# Patient Record
Sex: FEMALE | Race: WHITE | Employment: FULL TIME | URBAN - METROPOLITAN AREA
[De-identification: names, ages, dates, MRNs, and addresses within clinical notes are randomized per-mention and may not be internally consistent; named-entity substitution may affect disease eponyms.]

---

## 2017-08-11 ENCOUNTER — HOSPITAL ENCOUNTER (OUTPATIENT)
Dept: MAMMOGRAPHY | Age: 52
Discharge: HOME | End: 2017-08-11
Attending: OBSTETRICS & GYNECOLOGY
Payer: COMMERCIAL

## 2017-08-11 DIAGNOSIS — Z12.31 VISIT FOR SCREENING MAMMOGRAM: ICD-10-CM

## 2017-08-11 PROCEDURE — 77067 SCR MAMMO BI INCL CAD: CPT

## 2018-08-22 ENCOUNTER — HOSPITAL ENCOUNTER (OUTPATIENT)
Dept: MAMMOGRAPHY | Age: 53
Discharge: HOME | End: 2018-08-22
Attending: OBSTETRICS & GYNECOLOGY
Payer: COMMERCIAL

## 2018-08-22 DIAGNOSIS — Z12.39 BREAST SCREENING: ICD-10-CM

## 2018-08-22 PROCEDURE — 77067 SCR MAMMO BI INCL CAD: CPT

## 2019-06-27 ENCOUNTER — OFFICE VISIT (OUTPATIENT)
Dept: PRIMARY CARE CLINIC | Age: 54
End: 2019-06-27

## 2019-06-27 VITALS
WEIGHT: 138.6 LBS | SYSTOLIC BLOOD PRESSURE: 113 MMHG | BODY MASS INDEX: 26.17 KG/M2 | HEART RATE: 85 BPM | TEMPERATURE: 99.5 F | DIASTOLIC BLOOD PRESSURE: 73 MMHG | OXYGEN SATURATION: 98 % | HEIGHT: 61 IN | RESPIRATION RATE: 14 BRPM

## 2019-06-27 DIAGNOSIS — M70.22 OLECRANON BURSITIS OF LEFT ELBOW: Primary | ICD-10-CM

## 2019-06-27 RX ORDER — FEXOFENADINE HCL 60 MG
TABLET ORAL DAILY
COMMUNITY
Start: 2007-07-10

## 2019-06-27 RX ORDER — LANOLIN ALCOHOL/MO/W.PET/CERES
CREAM (GRAM) TOPICAL AS NEEDED
COMMUNITY
Start: 2012-08-07

## 2019-06-27 RX ORDER — CLINDAMYCIN HYDROCHLORIDE 300 MG/1
300 CAPSULE ORAL 3 TIMES DAILY
Qty: 42 CAP | Refills: 0 | Status: SHIPPED | OUTPATIENT
Start: 2019-06-27 | End: 2019-07-11

## 2019-06-27 RX ORDER — MOMETASONE FUROATE 1 MG/G
CREAM TOPICAL AS NEEDED
COMMUNITY
Start: 2015-10-27

## 2019-06-27 RX ORDER — CLOBETASOL PROPIONATE 0.5 MG/ML
LOTION TOPICAL
COMMUNITY

## 2019-06-27 RX ORDER — FLUTICASONE PROPIONATE AND SALMETEROL 100; 50 UG/1; UG/1
1 POWDER RESPIRATORY (INHALATION) 2 TIMES DAILY
COMMUNITY
Start: 2007-07-10

## 2019-06-27 RX ORDER — PSEUDOEPHEDRINE HCL 30 MG
TABLET ORAL AS NEEDED
COMMUNITY
Start: 2008-07-18

## 2019-06-27 RX ORDER — HYDROCORTISONE 25 MG/G
CREAM TOPICAL AS NEEDED
COMMUNITY
Start: 2019-04-24

## 2019-06-27 RX ORDER — FLUTICASONE PROPIONATE 50 MCG
SPRAY, SUSPENSION (ML) NASAL DAILY
COMMUNITY
Start: 2007-07-10

## 2019-06-27 RX ORDER — PIMECROLIMUS 10 MG/G
CREAM TOPICAL AS NEEDED
COMMUNITY

## 2019-06-27 NOTE — PROGRESS NOTES
Sachin Joe is a 47 y.o. female    Chief Complaint   Patient presents with    Elbow Swelling     Left Elbow Swelling     1. Have you been to the ER, urgent care clinic since your last visit? Hospitalized since your last visit? No    2. Have you seen or consulted any other health care providers outside of the [address] [place] [address] since your last visit? Include any pap smears or colon screening.  No

## 2019-06-29 NOTE — PATIENT INSTRUCTIONS
Bursitis of the Elbow: Care Instructions  Your Care Instructions  Bursitis is pain and swelling of the bursae. These are sacs of fluid that help your joints move smoothly. Olecranon bursitis is a type of bursitis that affects the back of the elbow. This is sometimes called Eliud elbow because the bump that develops looks like the cartoon character Eliud's elbow. Injury, overuse, or prolonged pressure on your elbow can cause this form of bursitis. Sometimes it happens when people have arthritis. It also can occur for unknown reasons. Treatment may include draining fluid from the bursa with a needle. If your doctor thought there was infection, he or she may have prescribed antibiotics. You also may get shots of medicine into the bursa to help the swelling go down. Your elbow should get better in a few days or weeks. Follow-up care is a key part of your treatment and safety. Be sure to make and go to all appointments, and call your doctor if you are having problems. It's also a good idea to know your test results and keep a list of the medicines you take. How can you care for yourself at home? · Take pain medicines exactly as directed. ? If the doctor gave you a prescription medicine for pain, take it as prescribed. ? If you are not taking a prescription pain medicine, ask your doctor if you can take an over-the-counter medicine. ? Do not take two or more pain medicines at the same time unless the doctor told you to. Many pain medicines have acetaminophen, which is Tylenol. Too much acetaminophen (Tylenol) can be harmful. · If your doctor prescribed antibiotics, take them as directed. Do not stop taking them just because you feel better. You need to take the full course of antibiotics. · If your doctor gave you a sling, an elastic bandage, or a compression sleeve, wear it exactly as instructed. · Put ice or a cold pack on your elbow for 10 to 20 minutes at a time.  Try to do this every 1 to 2 hours for the next 3 days (when you are awake) or until the swelling goes down. Put a thin cloth between the ice and your skin. · After 3 days, you can try heat, or alternate heat and ice. · Rest your elbow. Try to stop or reduce any activity that causes pain. · Wear elbow pads during physical activity to prevent injury. · Do not lean your elbows on tables or armrests. When should you call for help? Call your doctor now or seek immediate medical care if:    · You have new or worse symptoms of infection, such as:  ? Increased pain, swelling, warmth, or redness. ? Red streaks leading from the area. ? Pus draining from the area. ? A fever.    Watch closely for changes in your health, and be sure to contact your doctor if:    · You do not get better as expected. Where can you learn more? Go to http://vito-jess.info/. Enter  in the search box to learn more about \"Bursitis of the Elbow: Care Instructions. \"  Current as of: September 20, 2018  Content Version: 11.9  © 9274-0630 [employer]. Care instructions adapted under license by [employer] (which disclaims liability or warranty for this information). If you have questions about a medical condition or this instruction, always ask your healthcare professional. [address] any warranty or liability for your use of this information.

## 2019-06-29 NOTE — PROGRESS NOTES
Subjective: Atif Blood is a 47 y.o. female seen for evaluation and treatment of a left elbow injury. This is evaluated as a personal injury. The injury was sustained 10 days ago, and occurred while she was working in the house, she bumped her left elbow on something and now it is swollen. She did not hear or sense a pop or snap at the time of the injury. The patient notes pain and moderate swelling since the injury. She has not injured this site in the past.    There is no problem list on file for this patient. There are no active problems to display for this patient. Current Outpatient Medications   Medication Sig Dispense Refill    fexofenadine (ALLEGRA ALLERGY) 60 mg tablet Allegra 60 mg tablet      fluticasone propionate (FLONASE) 50 mcg/actuation nasal spray Flonase 50 mcg/actuation nasal spray,suspension      fluticasone propion-salmeterol (ADVAIR DISKUS) 100-50 mcg/dose diskus inhaler Advair Diskus 100 mcg-50 mcg/dose powder for inhalation      pseudoephedrine (SUDAFED) 30 mg tablet as needed.  melatonin 3 mg tablet as needed.  hydrocortisone (HYTONE) 2.5 % topical cream       pimecrolimus (ELIDEL) 1 % topical cream Elidel 1 % topical cream   APPLY A THIN LAYER TO THE AFFECTED AREA(S) BY TOPICAL ROUTE 2 TIMES PER DAY ; RUB IN GENTLY AND COMPLETELY      mometasone (ELOCON) 0.1 % topical cream mometasone 0.1 % topical cream      clobetasol (CLOBEX) 0.05 % lotion clobetasol 0.05 % lotion   APPLY A THIN LAYER TO THE AFFECTED AREA(S) BY TOPICAL ROUTE 2 TIMES PER DAY      clindamycin (CLEOCIN) 300 mg capsule Take 1 Cap by mouth three (3) times daily for 14 days. 42 Cap 0     Allergies   Allergen Reactions    Sulfa (Sulfonamide Antibiotics) Rash     History reviewed. No pertinent past medical history. History reviewed. No pertinent surgical history.   Family History   Problem Relation Age of Onset    Breast Cancer Paternal Aunt      Social History     Tobacco Use    Smoking status: Never Smoker    Smokeless tobacco: Never Used   Substance Use Topics    Alcohol use: Yes     Comment: Rarely        Objective:     Visit Vitals  /73 (BP 1 Location: Right arm, BP Patient Position: Sitting)   Pulse 85   Temp 99.5 °F (37.5 °C) (Oral)   Resp 14   Ht 5' 1\" (1.549 m)   Wt 138 lb 9.6 oz (62.9 kg)   SpO2 98%   BMI 26.19 kg/m²      Appearance: alert, well appearing, and in no distress, oriented to person, place, and time, normal appearing weight, acyanotic, in no respiratory distress and well hydrated. Musculoskeletal exam: no joint tenderness, deformity or swelling. Imaging  is not indicated    Assessment/Plan:       ICD-10-CM ICD-9-CM    1. Olecranon bursitis of left elbow M70.22 726.33 clindamycin (CLEOCIN) 300 mg capsule      REFERRAL TO ORTHOPEDIC SURGERY     reviewed medications and side effects in detail  The patient is advised to rest.    Visit Vitals  /73 (BP 1 Location: Right arm, BP Patient Position: Sitting)   Pulse 85   Temp 99.5 °F (37.5 °C) (Oral)   Resp 14   Ht 5' 1\" (1.549 m)   Wt 138 lb 9.6 oz (62.9 kg)   SpO2 98%   BMI 26.19 kg/m²     Spoke with the patient regarding their blood pressure (BP) reading at today's visit. The patient verbalized understanding of need to maintain BP lower than 140/90. The patient will follow up with their primary care physician regarding management and/or medications that may be needed. Drepssion Screening has been completed. The patient isdenies having a history of depression. The patient is nottaking medication and is being followed by their PCP at this time. This patient does  have a primary care physician. Referral was not given a referral at todays visit. Immunizations: The patient is current on their influenza immunization at this time. The patient does not   want to receive the influenza immunization today. Follow up instructions given at today's visit were verbalized by the patient/parent.   The signs of infection are fever > 100.4, increase fatigue, change in mental status, or decrease in urinary output. The patient/parent verbalized understanding of taking medications prescribed during this visit as prescribed.

## 2019-08-23 ENCOUNTER — HOSPITAL ENCOUNTER (OUTPATIENT)
Dept: MAMMOGRAPHY | Age: 54
Discharge: HOME | End: 2019-08-23
Attending: OBSTETRICS & GYNECOLOGY
Payer: COMMERCIAL

## 2019-08-23 DIAGNOSIS — Z12.39 BREAST SCREENING, UNSPECIFIED: ICD-10-CM

## 2019-08-23 PROCEDURE — 77063 BREAST TOMOSYNTHESIS BI: CPT

## 2019-10-09 ENCOUNTER — CLINICAL SUPPORT (OUTPATIENT)
Dept: PRIMARY CARE CLINIC | Age: 54
End: 2019-10-09

## 2019-10-09 DIAGNOSIS — Z23 ENCOUNTER FOR IMMUNIZATION: Primary | ICD-10-CM

## 2019-10-10 NOTE — PROGRESS NOTES
Chief Complaint   Patient presents with    Immunization/Injection     Placement of Influenza vaccine     Immunization/s administered 10/10/2019 by David Proctor LPN with guardian's consent. Patient tolerated procedure well. No reactions noted.

## 2019-10-10 NOTE — PATIENT INSTRUCTIONS
Vaccine Information Statement    Influenza (Flu) Vaccine (Inactivated or Recombinant): What You Need to Know    Many Vaccine Information Statements are available in Bulgarian and other languages. See www.immunize.org/vis  Hojas de información sobre vacunas están disponibles en español y en muchos otros idiomas. Visite www.immunize.org/vis    1. Why get vaccinated? Influenza vaccine can prevent influenza (flu). Flu is a contagious disease that spreads around the [facility] every year, usually between October and May. Anyone can get the flu, but it is more dangerous for some people. Infants and young children, people 72years of age and older, pregnant women, and people with certain health conditions or a weakened immune system are at greatest risk of flu complications. Pneumonia, bronchitis, sinus infections and ear infections are examples of flu-related complications. If you have a medical condition, such as heart disease, cancer or diabetes, flu can make it worse. Flu can cause fever and chills, sore throat, muscle aches, fatigue, cough, headache, and runny or stuffy nose. Some people may have vomiting and diarrhea, though this is more common in children than adults. Each year thousands of people in the [facility] die from flu, and many more are hospitalized. Flu vaccine prevents millions of illnesses and flu-related visits to the doctor each year. 2. Influenza vaccines     CDC recommends everyone 10months of age and older get vaccinated every flu season. Children 6 months through 6years of age may need 2 doses during a single flu season. Everyone else needs only 1 dose each flu season. It takes about 2 weeks for protection to develop after vaccination. There are many flu viruses, and they are always changing. Each year a new flu vaccine is made to protect against three or four viruses that are likely to cause disease in the upcoming flu season.  Even when the vaccine doesnt exactly match these viruses, it may still provide some protection. Influenza vaccine does not cause flu. Influenza vaccine may be given at the same time as other vaccines. 3. Talk with your health care provider    Tell your vaccine provider if the person getting the vaccine:   Has had an allergic reaction after a previous dose of influenza vaccine, or has any severe, life-threatening allergies.  Has ever had Guillain-Barré Syndrome (also called GBS). In some cases, your health care provider may decide to postpone influenza vaccination to a future visit. People with minor illnesses, such as a cold, may be vaccinated. People who are moderately or severely ill should usually wait until they recover before getting influenza vaccine. Your health care provider can give you more information. 4. Risks of a reaction     Soreness, redness, and swelling where shot is given, fever, muscle aches, and headache can happen after influenza vaccine.  There may be a very small increased risk of Guillain-Barré Syndrome (GBS) after inactivated influenza vaccine (the flu shot). Niecy Dumont children who get the flu shot along with pneumococcal vaccine (PCV13), and/or DTaP vaccine at the same time might be slightly more likely to have a seizure caused by fever. Tell your health care provider if a child who is getting flu vaccine has ever had a seizure. People sometimes faint after medical procedures, including vaccination. Tell your provider if you feel dizzy or have vision changes or ringing in the ears. As with any medicine, there is a very remote chance of a vaccine causing a severe allergic reaction, other serious injury, or death. 5. What if there is a serious problem? An allergic reaction could occur after the vaccinated person leaves the clinic.  If you see signs of a severe allergic reaction (hives, swelling of the face and throat, difficulty breathing, a fast heartbeat, dizziness, or weakness), call 9-1-1 and get the person to the nearest hospital.    For other signs that concern you, call your health care provider. Adverse reactions should be reported to the Vaccine Adverse Event Reporting System (VAERS). Your health care provider will usually file this report, or you can do it yourself. Visit the VAERS website at www.vaers. [facility].gov or call 2-108.518.8589. VAERS is only for reporting reactions, and VAERS staff do not give medical advice. 6. The National Vaccine Injury Compensation Program    The [facility] Vaccine Injury Compensation Program (VICP) is a federal program that was created to compensate people who may have been injured by certain vaccines. Visit the VICP website at www.[facility].gov/vaccinecompensation or call 5-114.472.9050 to learn about the program and about filing a claim. There is a time limit to file a claim for compensation. 7. How can I learn more?  Ask your health care provider.  Call your local or state health department.  Contact the Centers for Disease Control and Prevention (CDC):  - Call 0-161.638.5254 (1-800-CDC-INFO) or  - Visit CDCs influenza website at www.cdc.gov/flu    Vaccine Information Statement (Interim)  Inactivated Influenza Vaccine   8/15/2019  [address] [id number]-[id number]   Department of Health and Human Services  Centers for Disease Control and Prevention    Office Use Only

## 2019-11-18 ENCOUNTER — OFFICE VISIT (OUTPATIENT)
Dept: PRIMARY CARE CLINIC | Age: 54
End: 2019-11-18

## 2019-11-18 VITALS
HEIGHT: 61 IN | RESPIRATION RATE: 16 BRPM | HEART RATE: 96 BPM | DIASTOLIC BLOOD PRESSURE: 81 MMHG | WEIGHT: 140.6 LBS | SYSTOLIC BLOOD PRESSURE: 119 MMHG | TEMPERATURE: 98.5 F | OXYGEN SATURATION: 95 % | BODY MASS INDEX: 26.55 KG/M2

## 2019-11-18 DIAGNOSIS — T63.301A SPIDER BITE WOUND, ACCIDENTAL OR UNINTENTIONAL, INITIAL ENCOUNTER: Primary | ICD-10-CM

## 2019-11-18 RX ORDER — HYDROCORTISONE 25 MG/G
CREAM TOPICAL
COMMUNITY
Start: 2019-04-24 | End: 2019-11-18 | Stop reason: SDUPTHER

## 2019-11-18 RX ORDER — TRAZODONE HYDROCHLORIDE 50 MG/1
TABLET ORAL
COMMUNITY
Start: 2015-10-27

## 2019-11-18 RX ORDER — FLUTICASONE PROPIONATE 50 MCG
SPRAY, SUSPENSION (ML) NASAL
COMMUNITY
Start: 2007-07-10 | End: 2019-11-18 | Stop reason: SDUPTHER

## 2019-11-18 RX ORDER — PSEUDOEPHEDRINE HCL 30 MG
TABLET ORAL
COMMUNITY
Start: 2008-07-18 | End: 2019-11-18 | Stop reason: SDUPTHER

## 2019-11-18 RX ORDER — NAPROXEN 500 MG/1
500 TABLET ORAL
COMMUNITY
Start: 2019-07-05 | End: 2020-07-04

## 2019-11-18 RX ORDER — POLYMYXIN B SULFATE AND TRIMETHOPRIM 1; 10000 MG/ML; [USP'U]/ML
SOLUTION OPHTHALMIC
COMMUNITY
Start: 2019-07-26

## 2019-11-18 RX ORDER — DESONIDE 0.5 MG/G
CREAM TOPICAL AS NEEDED
COMMUNITY
Start: 2015-10-27

## 2019-11-18 RX ORDER — MOMETASONE FUROATE 1 MG/G
CREAM TOPICAL
COMMUNITY
Start: 2015-10-27 | End: 2019-11-18 | Stop reason: SDUPTHER

## 2019-11-18 RX ORDER — LANOLIN ALCOHOL/MO/W.PET/CERES
CREAM (GRAM) TOPICAL
COMMUNITY
Start: 2012-08-07 | End: 2019-11-18 | Stop reason: SDUPTHER

## 2019-11-18 RX ORDER — CLOBETASOL PROPIONATE 0.5 MG/ML
LOTION TOPICAL
COMMUNITY
End: 2019-11-18 | Stop reason: SDUPTHER

## 2019-11-18 RX ORDER — RANITIDINE 150 MG/1
TABLET, FILM COATED ORAL
COMMUNITY

## 2019-11-18 RX ORDER — FLUTICASONE PROPIONATE AND SALMETEROL 100; 50 UG/1; UG/1
POWDER RESPIRATORY (INHALATION)
COMMUNITY
Start: 2007-07-10 | End: 2019-11-18 | Stop reason: SDUPTHER

## 2019-11-18 RX ORDER — FEXOFENADINE HCL 60 MG
TABLET ORAL
COMMUNITY
Start: 2007-07-10 | End: 2019-11-18 | Stop reason: SDUPTHER

## 2019-11-18 RX ORDER — PIMECROLIMUS 10 MG/G
CREAM TOPICAL
COMMUNITY
End: 2019-11-18 | Stop reason: SDUPTHER

## 2019-11-18 RX ORDER — CLINDAMYCIN HYDROCHLORIDE 300 MG/1
CAPSULE ORAL
COMMUNITY
Start: 2019-06-27

## 2019-11-18 NOTE — PROGRESS NOTES
HISTORY OF PRESENT ILLNESS  Jayson Webb is a 47 y.o. female. HPI   This 47year old lady presents c/o -was informed by the school nurse to see a doctor. She believes she was \"bitten\" by a spider about 1 week ago. The area of the bite per pt has become dark and painful    Review of Systems   Constitutional: Negative for fever. Eyes: Negative for double vision, photophobia and pain. Gastrointestinal: Negative for nausea. Skin:        As per hpi       Physical Exam   Constitutional: She appears well-developed and well-nourished. No distress. Eyes: Pupils are equal, round, and reactive to light. Neck: Normal range of motion. Neck supple. Skin:   Right forehead-very darkish area about 0.5x0.5cm appearing to be necrotic tissue,very mild surrounding erythema       ASSESSMENT and PLAN    ICD-10-CM ICD-9-CM    1.  Spider bite wound, accidental or unintentional, initial encounter T63.301A 989.5 REFERRAL TO GENERAL SURGERY     E905.1    Referal to gen surg asap  Might need debridement  Precautions given

## 2019-11-18 NOTE — PROGRESS NOTES
Mera Samano is a 47 y.o. female    Room:4    Chief Complaint   Patient presents with   [address]     PT States \" possible spider bite on forehead nurse sent me over, has been using mupirocin and clindamycin and started taking yesterday and benedryl for swelling, last wednseday and thought it was just ezema but it didnt get any better, and eye swelling on thursday\". .         Visit Vitals  /81 (BP 1 Location: Left arm, BP Patient Position: Sitting)   Pulse 96   Temp 98.5 °F (36.9 °C) (Oral)   Resp 16   Ht 5' 1\" (1.549 m)   Wt 140 lb 9.6 oz (63.8 kg)   SpO2 95%   BMI 26.57 kg/m²       Pain Scale: 3/10    1. Have you been to the ER, urgent care clinic since your last visit? Hospitalized since your last visit? No    2. Have you seen or consulted any other health care providers outside of the [address] [place] [address] since your last visit? Include any pap smears or colon screening.  No

## 2019-11-19 ENCOUNTER — OFFICE VISIT (OUTPATIENT)
Dept: SURGERY | Age: 54
End: 2019-11-19

## 2019-11-19 VITALS
BODY MASS INDEX: 26.55 KG/M2 | HEIGHT: 61 IN | HEART RATE: 92 BPM | WEIGHT: 140.6 LBS | OXYGEN SATURATION: 96 % | SYSTOLIC BLOOD PRESSURE: 120 MMHG | TEMPERATURE: 99.6 F | DIASTOLIC BLOOD PRESSURE: 81 MMHG

## 2019-11-19 DIAGNOSIS — R23.4 SKIN ESCHAR: ICD-10-CM

## 2019-11-19 DIAGNOSIS — L30.9 ECZEMA OF FACE: Primary | ICD-10-CM

## 2019-11-19 RX ORDER — MUPIROCIN 20 MG/G
OINTMENT TOPICAL AS NEEDED
COMMUNITY
Start: 2019-10-29

## 2019-11-19 NOTE — PROGRESS NOTES
Chief Complaint   Patient presents with    Skin Problem     SEEN AT THE REQUEST OF DR. RITU KU, FOR SPIDER BITE     1. Have you been to the ER, urgent care clinic since your last visit? Hospitalized since your last visit? NO    2. Have you seen or consulted any other health care providers outside of the [address] [place] [address] since your last visit? Include any pap smears or colon screening.  NO

## 2019-11-19 NOTE — LETTER
11/19/19 Patient: Donny Echavarria YOB: 1965 Date of Visit: 11/19/2019 Scooter Vigil MD 
[address] [place] [address] VIA Facsimile: [phone] Pancho Alfred MD 
[address] [address] [address] VIA In Basket Dear MD Pancho Gordon MD, Thank you for referring Ms. Kenya Mckeon to  [facility] for evaluation. My notes for this consultation are attached. If you have questions, please do not hesitate to call me. I look forward to following your patient along with you.  
 
 
Sincerely, 
 
Juani Garcia MD

## 2019-11-27 ENCOUNTER — OFFICE VISIT (OUTPATIENT)
Dept: SURGERY | Age: 54
End: 2019-11-27

## 2019-11-27 VITALS
DIASTOLIC BLOOD PRESSURE: 71 MMHG | HEIGHT: 61 IN | WEIGHT: 136.9 LBS | OXYGEN SATURATION: 97 % | HEART RATE: 87 BPM | BODY MASS INDEX: 25.85 KG/M2 | TEMPERATURE: 97.2 F | RESPIRATION RATE: 20 BRPM | SYSTOLIC BLOOD PRESSURE: 113 MMHG

## 2019-11-27 DIAGNOSIS — R23.4 SKIN ESCHAR: Primary | ICD-10-CM

## 2019-11-27 NOTE — PROGRESS NOTES
HISTORY OF PRESENT ILLNESS  Keyla Velez is a 47 y.o. female who comes in for follow up by [facility] for a forehead wound  HPI    She noted a wound on her forehead 11/14. She has eczema and it was itching and she thought she may have scratched it. She stated it started to look better by 11/15-16 but it is still swollen and she reports some puffiness around her eyes. She denies myalgias, paresthesias, muscle twitching, fever, chills, or sweats. She saw someone who thought it may be from a spider bite, although she states that she has not been around spiders and denies seeing one on her. She has been utilizing mupirocin on it. Past Medical History:   Diagnosis Date    Arthritis      History reviewed. No pertinent surgical history. Family History   Problem Relation Age of Onset    Breast Cancer Paternal [de-identified]     Stroke Mother     Hypertension Father      Social History     Tobacco Use    Smoking status: Never Smoker    Smokeless tobacco: Never Used   Substance Use Topics    Alcohol use: Yes     Comment: Rarely    Drug use: Never     Current Outpatient Medications   Medication Sig    diphenhydramine HCl (BENADRYL PO) Take  by mouth as needed.  mupirocin (BACTROBAN) 2 % ointment as needed.  traZODone (DESYREL) 50 mg tablet trazodone 50 mg tablet    desonide (TRIDESILON) 0.05 % cream as needed.  fexofenadine (ALLEGRA ALLERGY) 60 mg tablet daily.  fluticasone propionate (FLONASE) 50 mcg/actuation nasal spray daily.  fluticasone propion-salmeterol (ADVAIR DISKUS) 100-50 mcg/dose diskus inhaler 1 Puff two (2) times a day.  pseudoephedrine (SUDAFED) 30 mg tablet as needed.  melatonin 3 mg tablet as needed.  hydrocortisone (HYTONE) 2.5 % topical cream as needed.  pimecrolimus (ELIDEL) 1 % topical cream as needed.  mometasone (ELOCON) 0.1 % topical cream as needed.     clobetasol (CLOBEX) 0.05 % lotion clobetasol 0.05 % lotion   APPLY A THIN LAYER TO THE AFFECTED AREA(S) BY TOPICAL ROUTE 2 TIMES PER DAY    raNITIdine (ZANTAC) 150 mg tablet ranitidine 150 mg tablet   Take 1 tablet twice a day by oral route.  trimethoprim-polymyxin b (POLYTRIM) ophthalmic solution     naproxen (NAPROSYN) 500 mg tablet Take 500 mg by mouth.  clindamycin (CLEOCIN) 300 mg capsule three (3) times daily as needed. No current facility-administered medications for this visit. Allergies   Allergen Reactions    Naproxen Hives    Sulfa (Sulfonamide Antibiotics) Rash       Review of Systems   Constitutional: Negative for chills, diaphoresis, fever and weight loss. HENT: Negative for sore throat. Eyes: Negative for blurred vision and discharge. Respiratory: Positive for wheezing. Negative for cough and shortness of breath. Cardiovascular: Negative for chest pain, palpitations, orthopnea, claudication and leg swelling. Gastrointestinal: Negative for abdominal pain, constipation, diarrhea, heartburn, melena, nausea and vomiting. Genitourinary: Negative for dysuria, flank pain, frequency and hematuria. Musculoskeletal: Negative for back pain, joint pain, myalgias and neck pain. Skin: Positive for rash. Neurological: Positive for headaches. Negative for dizziness, speech change, focal weakness, seizures, loss of consciousness and weakness. Endo/Heme/Allergies: Does not bruise/bleed easily. Psychiatric/Behavioral: Negative for depression and memory loss. Visit Vitals  /71   Pulse 87   Temp 97.2 °F (36.2 °C)   Resp 20   Ht 5' 1\" (1.549 m)   Wt 62.1 kg (136 lb 14.4 oz)   LMP 11/04/2019 (Exact Date)   SpO2 97%   BMI 25.87 kg/m²       Physical Exam  Constitutional:       General: She is not in acute distress. Appearance: She is well-developed. She is not diaphoretic. HENT:      Head: Normocephalic and atraumatic. Mouth/Throat:      Pharynx: No oropharyngeal exudate. Eyes:      General: No scleral icterus.      Conjunctiva/sclera: Conjunctivae normal. Pupils: Pupils are equal, round, and reactive to light. Neck:      Musculoskeletal: Normal range of motion and neck supple. Thyroid: No thyromegaly. Vascular: No JVD. Trachea: No tracheal deviation. Cardiovascular:      Rate and Rhythm: Normal rate and regular rhythm. Heart sounds: No murmur. No friction rub. No gallop. Pulmonary:      Effort: Pulmonary effort is normal. No respiratory distress. Breath sounds: Normal breath sounds. No wheezing or rales. Abdominal:      General: Bowel sounds are normal. There is no distension. Palpations: Abdomen is soft. There is no mass. Tenderness: There is no tenderness. There is no guarding or rebound. Musculoskeletal: Normal range of motion. Lymphadenopathy:      Cervical: No cervical adenopathy. Skin:     General: Skin is warm and dry. Coloration: Skin is not pale. Findings: No erythema or rash. Comments: Extensive eczematous rash on face and forehead  Dry eschar 1 x 1.5 cm on forehead in center of area of eczema  No purulent drainage, erythema or fluctuance    Edema around eyes has improved   Neurological:      Mental Status: She is alert and oriented to person, place, and time. Cranial Nerves: No cranial nerve deficit. Psychiatric:         Behavior: Behavior normal.         Thought Content: Thought content normal.         Judgment: Judgment normal.         ASSESSMENT and PLAN  1. Eschar over forehead with severe eczema. It looks a little better today. She does not recall seeing a spider or being around spiders. She does report itching the area a lot. I suspect she traumatized a fragile area of skin from the eczema causing necrosis. While it could be from a spider bite she does not have systemic symptoms. We discussed observation +/- mupirocin vs excision of the eschar. Excision of the eschar may allow it to heal faster but likely not by much.     I think this will heal without intervention but it will take time    Local wound care    RTC prn    Sudheer Yi MD FACS

## 2019-11-27 NOTE — PROGRESS NOTES
Chief Complaint   Patient presents with    Follow-up     1 week f/u wound on forehead      1. Have you been to the ER, urgent care clinic since your last visit? Hospitalized since your last visit? No    2. Have you seen or consulted any other health care providers outside of the [address] [place] [address] since your last visit? Include any pap smears or colon screening.  No

## 2020-08-28 ENCOUNTER — HOSPITAL ENCOUNTER (OUTPATIENT)
Dept: MAMMOGRAPHY | Age: 55
Discharge: HOME | End: 2020-08-28
Attending: OBSTETRICS & GYNECOLOGY
Payer: COMMERCIAL

## 2020-08-28 DIAGNOSIS — Z12.31 ENCOUNTER FOR SCREENING MAMMOGRAM FOR MALIGNANT NEOPLASM OF BREAST: ICD-10-CM

## 2020-08-28 PROCEDURE — 77063 BREAST TOMOSYNTHESIS BI: CPT

## 2022-03-18 PROBLEM — L30.9 ECZEMA OF FACE: Status: ACTIVE | Noted: 2019-11-19

## 2022-03-19 PROBLEM — R23.4 SKIN ESCHAR: Status: ACTIVE | Noted: 2019-11-19

## 2023-05-11 RX ORDER — DESONIDE 0.5 MG/G
CREAM TOPICAL PRN
COMMUNITY
Start: 2015-10-27

## 2023-05-11 RX ORDER — FLUTICASONE PROPIONATE 50 MCG
SPRAY, SUSPENSION (ML) NASAL DAILY
COMMUNITY
Start: 2007-07-10

## 2023-05-11 RX ORDER — FLUTICASONE PROPIONATE AND SALMETEROL 100; 50 UG/1; UG/1
1 POWDER RESPIRATORY (INHALATION) 2 TIMES DAILY
COMMUNITY
Start: 2007-07-10

## 2023-05-11 RX ORDER — CLOBETASOL PROPIONATE 0.5 MG/ML
LOTION TOPICAL
COMMUNITY

## 2023-05-11 RX ORDER — TRAZODONE HYDROCHLORIDE 50 MG/1
TABLET ORAL
COMMUNITY
Start: 2015-10-27

## 2023-05-11 RX ORDER — RANITIDINE 150 MG/1
TABLET ORAL
COMMUNITY

## 2023-05-11 RX ORDER — MOMETASONE FUROATE 1 MG/G
CREAM TOPICAL PRN
COMMUNITY
Start: 2015-10-27

## 2023-05-11 RX ORDER — POLYMYXIN B SULFATE AND TRIMETHOPRIM 1; 10000 MG/ML; [USP'U]/ML
SOLUTION OPHTHALMIC
COMMUNITY
Start: 2019-07-26

## 2023-05-11 RX ORDER — CLINDAMYCIN HYDROCHLORIDE 300 MG/1
CAPSULE ORAL 3 TIMES DAILY PRN
COMMUNITY
Start: 2019-06-27

## 2023-05-11 RX ORDER — PSEUDOEPHEDRINE HCL 30 MG
TABLET ORAL PRN
COMMUNITY
Start: 2008-07-18

## 2023-05-11 RX ORDER — PIMECROLIMUS 10 MG/G
CREAM TOPICAL PRN
COMMUNITY

## 2023-05-11 RX ORDER — LANOLIN ALCOHOL/MO/W.PET/CERES
CREAM (GRAM) TOPICAL PRN
COMMUNITY
Start: 2012-08-07

## 2023-05-11 RX ORDER — FEXOFENADINE HYDROCHLORIDE 60 MG/1
TABLET, FILM COATED ORAL DAILY
COMMUNITY
Start: 2007-07-10